# Patient Record
Sex: FEMALE | Race: WHITE | Employment: FULL TIME | URBAN - METROPOLITAN AREA
[De-identification: names, ages, dates, MRNs, and addresses within clinical notes are randomized per-mention and may not be internally consistent; named-entity substitution may affect disease eponyms.]

---

## 2017-12-04 DIAGNOSIS — N93.8 DUB (DYSFUNCTIONAL UTERINE BLEEDING): Primary | ICD-10-CM

## 2017-12-04 RX ORDER — NORETHINDRONE ACETATE AND ETHINYL ESTRADIOL AND FERROUS FUMARATE 1MG-20(24)
1 KIT ORAL DAILY
Qty: 28 TABLET | Refills: 1 | Status: SHIPPED | OUTPATIENT
Start: 2017-12-04 | End: 2021-10-30 | Stop reason: ALTCHOICE

## 2018-01-04 ENCOUNTER — OFFICE VISIT (OUTPATIENT)
Dept: OBGYN | Age: 48
End: 2018-01-04
Payer: COMMERCIAL

## 2018-01-04 ENCOUNTER — HOSPITAL ENCOUNTER (OUTPATIENT)
Age: 48
Setting detail: SPECIMEN
Discharge: HOME | End: 2018-01-04
Payer: COMMERCIAL

## 2018-01-04 VITALS
DIASTOLIC BLOOD PRESSURE: 74 MMHG | HEIGHT: 64 IN | WEIGHT: 234 LBS | SYSTOLIC BLOOD PRESSURE: 130 MMHG | BODY MASS INDEX: 39.95 KG/M2

## 2018-01-04 DIAGNOSIS — Z00.00 WELLNESS EXAMINATION: Primary | ICD-10-CM

## 2018-01-04 DIAGNOSIS — Z01.419 WOMEN'S ANNUAL ROUTINE GYNECOLOGICAL EXAMINATION: ICD-10-CM

## 2018-01-04 LAB
BILIRUBIN, POC: NORMAL
BLOOD URINE, POC: NORMAL
CLARITY, POC: CLEAR
COLOR, POC: YELLOW
GLUCOSE URINE, POC: NORMAL
HGB, POC: 13.4
KETONES, POC: NORMAL
LEUKOCYTE EST, POC: NORMAL
NITRITE, POC: NORMAL
PH, POC: 6
PROTEIN, POC: NORMAL
SPECIFIC GRAVITY, POC: 1.01
UROBILINOGEN, POC: NORMAL

## 2018-01-04 PROCEDURE — G0145 SCR C/V CYTO,THINLAYER,RESCR: HCPCS

## 2018-01-04 PROCEDURE — 99396 PREV VISIT EST AGE 40-64: CPT | Performed by: ADVANCED PRACTICE MIDWIFE

## 2018-01-04 PROCEDURE — 87624 HPV HI-RISK TYP POOLED RSLT: CPT

## 2018-01-04 RX ORDER — NORETHINDRONE ACETATE AND ETHINYL ESTRADIOL AND FERROUS FUMARATE 1MG-20(24)
1 KIT ORAL DAILY
Qty: 28 TABLET | Refills: 12 | Status: SHIPPED | OUTPATIENT
Start: 2018-01-04 | End: 2021-10-30 | Stop reason: ALTCHOICE

## 2018-01-04 RX ORDER — M-VIT,TX,IRON,MINS/CALC/FOLIC 27MG-0.4MG
1 TABLET ORAL DAILY
COMMUNITY

## 2018-01-04 RX ORDER — LISINOPRIL 10 MG/1
10 TABLET ORAL DAILY
COMMUNITY
End: 2022-11-02 | Stop reason: ALTCHOICE

## 2018-01-04 ASSESSMENT — PATIENT HEALTH QUESTIONNAIRE - PHQ9
2. FEELING DOWN, DEPRESSED OR HOPELESS: 0
1. LITTLE INTEREST OR PLEASURE IN DOING THINGS: 0
SUM OF ALL RESPONSES TO PHQ QUESTIONS 1-9: 0
SUM OF ALL RESPONSES TO PHQ9 QUESTIONS 1 & 2: 0

## 2018-01-05 LAB
HPV SAMPLE: NORMAL
HPV SOURCE: NORMAL
HPV, GENOTYPE 16: NOT DETECTED
HPV, GENOTYPE 18: NOT DETECTED
HPV, HIGH RISK OTHER: NOT DETECTED
HPV, INTERPRETATION: NORMAL

## 2018-01-05 ASSESSMENT — ENCOUNTER SYMPTOMS
SORE THROAT: 0
SHORTNESS OF BREATH: 0
CONSTIPATION: 0
NAUSEA: 0
VOMITING: 0
ABDOMINAL PAIN: 0
BACK PAIN: 0
DIARRHEA: 0

## 2018-01-15 LAB — CYTOLOGY REPORT: NORMAL

## 2018-03-14 ENCOUNTER — HOSPITAL ENCOUNTER (OUTPATIENT)
Dept: WOMENS IMAGING | Age: 48
Discharge: HOME | End: 2018-03-16
Payer: COMMERCIAL

## 2018-03-14 DIAGNOSIS — Z01.419 WOMEN'S ANNUAL ROUTINE GYNECOLOGICAL EXAMINATION: ICD-10-CM

## 2018-03-14 PROCEDURE — 77067 SCR MAMMO BI INCL CAD: CPT

## 2019-01-10 DIAGNOSIS — N93.8 DUB (DYSFUNCTIONAL UTERINE BLEEDING): Primary | ICD-10-CM

## 2019-01-10 RX ORDER — NORETHINDRONE ACETATE AND ETHINYL ESTRADIOL AND FERROUS FUMARATE 1MG-20(24)
1 KIT ORAL DAILY
Qty: 28 TABLET | Refills: 3 | Status: SHIPPED | OUTPATIENT
Start: 2019-01-10 | End: 2019-05-21 | Stop reason: SDUPTHER

## 2019-04-24 DIAGNOSIS — N93.8 DUB (DYSFUNCTIONAL UTERINE BLEEDING): Primary | ICD-10-CM

## 2019-04-24 RX ORDER — NORETHINDRONE ACETATE AND ETHINYL ESTRADIOL AND FERROUS FUMARATE 1MG-20(24)
1 KIT ORAL DAILY
Qty: 28 TABLET | Refills: 3 | Status: SHIPPED | OUTPATIENT
Start: 2019-04-24 | End: 2021-10-30 | Stop reason: ALTCHOICE

## 2019-05-21 DIAGNOSIS — N93.8 DUB (DYSFUNCTIONAL UTERINE BLEEDING): ICD-10-CM

## 2019-05-21 RX ORDER — NORETHINDRONE ACETATE AND ETHINYL ESTRADIOL AND FERROUS FUMARATE 1MG-20(24)
KIT ORAL
Qty: 28 TABLET | Refills: 3 | Status: SHIPPED | OUTPATIENT
Start: 2019-05-21 | End: 2021-10-11 | Stop reason: SDUPTHER

## 2019-07-08 DIAGNOSIS — N93.8 DUB (DYSFUNCTIONAL UTERINE BLEEDING): Primary | ICD-10-CM

## 2019-07-08 RX ORDER — NORETHINDRONE ACETATE AND ETHINYL ESTRADIOL 1MG-20(21)
1 KIT ORAL DAILY
Qty: 1 PACKET | Refills: 3 | Status: SHIPPED | OUTPATIENT
Start: 2019-07-08 | End: 2022-01-13

## 2019-08-22 ENCOUNTER — OFFICE VISIT (OUTPATIENT)
Dept: OBGYN | Age: 49
End: 2019-08-22
Payer: COMMERCIAL

## 2019-08-22 VITALS
BODY MASS INDEX: 37.52 KG/M2 | SYSTOLIC BLOOD PRESSURE: 134 MMHG | WEIGHT: 219.8 LBS | HEIGHT: 64 IN | DIASTOLIC BLOOD PRESSURE: 82 MMHG

## 2019-08-22 DIAGNOSIS — Z01.419 ENCOUNTER FOR ANNUAL ROUTINE GYNECOLOGICAL EXAMINATION: Primary | ICD-10-CM

## 2019-08-22 PROCEDURE — 99396 PREV VISIT EST AGE 40-64: CPT | Performed by: ADVANCED PRACTICE MIDWIFE

## 2019-08-22 RX ORDER — NORETHINDRONE ACETATE AND ETHINYL ESTRADIOL AND FERROUS FUMARATE 1MG-20(24)
1 KIT ORAL DAILY
Qty: 28 TABLET | Refills: 12 | Status: SHIPPED | OUTPATIENT
Start: 2019-08-22 | End: 2021-10-30 | Stop reason: ALTCHOICE

## 2019-08-22 ASSESSMENT — ENCOUNTER SYMPTOMS
DIARRHEA: 0
SHORTNESS OF BREATH: 0
BACK PAIN: 0
NAUSEA: 0
ABDOMINAL PAIN: 0
CONSTIPATION: 0
SORE THROAT: 0

## 2019-08-22 ASSESSMENT — PATIENT HEALTH QUESTIONNAIRE - PHQ9
1. LITTLE INTEREST OR PLEASURE IN DOING THINGS: 0
SUM OF ALL RESPONSES TO PHQ QUESTIONS 1-9: 0
2. FEELING DOWN, DEPRESSED OR HOPELESS: 0
SUM OF ALL RESPONSES TO PHQ QUESTIONS 1-9: 0
SUM OF ALL RESPONSES TO PHQ9 QUESTIONS 1 & 2: 0

## 2019-12-04 ENCOUNTER — HOSPITAL ENCOUNTER (OUTPATIENT)
Dept: WOMENS IMAGING | Age: 49
Discharge: HOME | End: 2019-12-06
Payer: COMMERCIAL

## 2019-12-04 DIAGNOSIS — Z01.419 ENCOUNTER FOR ANNUAL ROUTINE GYNECOLOGICAL EXAMINATION: ICD-10-CM

## 2019-12-04 PROCEDURE — 77063 BREAST TOMOSYNTHESIS BI: CPT

## 2020-09-24 ENCOUNTER — OFFICE VISIT (OUTPATIENT)
Dept: OBGYN | Age: 50
End: 2020-09-24
Payer: COMMERCIAL

## 2020-09-24 VITALS
BODY MASS INDEX: 39.78 KG/M2 | HEIGHT: 64 IN | SYSTOLIC BLOOD PRESSURE: 130 MMHG | DIASTOLIC BLOOD PRESSURE: 82 MMHG | WEIGHT: 233 LBS

## 2020-09-24 PROCEDURE — 99396 PREV VISIT EST AGE 40-64: CPT | Performed by: ADVANCED PRACTICE MIDWIFE

## 2020-09-24 RX ORDER — NORETHINDRONE ACETATE AND ETHINYL ESTRADIOL AND FERROUS FUMARATE 1MG-20(24)
1 KIT ORAL DAILY
Qty: 28 TABLET | Refills: 12 | Status: SHIPPED | OUTPATIENT
Start: 2020-09-24 | End: 2021-10-30 | Stop reason: ALTCHOICE

## 2020-09-24 ASSESSMENT — PATIENT HEALTH QUESTIONNAIRE - PHQ9
SUM OF ALL RESPONSES TO PHQ QUESTIONS 1-9: 0
SUM OF ALL RESPONSES TO PHQ QUESTIONS 1-9: 0
SUM OF ALL RESPONSES TO PHQ9 QUESTIONS 1 & 2: 0
2. FEELING DOWN, DEPRESSED OR HOPELESS: 0
1. LITTLE INTEREST OR PLEASURE IN DOING THINGS: 0

## 2020-09-24 ASSESSMENT — ENCOUNTER SYMPTOMS
BACK PAIN: 0
SHORTNESS OF BREATH: 0
ABDOMINAL PAIN: 0

## 2020-09-24 NOTE — PROGRESS NOTES
YEARLY PHYSICAL    Date of service: 2020    Annie Mike  Is a 52 y.o.   female    PT's PCP is: Ida Garcia MD     : 1970                                             Subjective:       Patient's last menstrual period was 2020 (approximate). Are your menses regular: yes    OB History    Para Term  AB Living   3 2 2   1 2   SAB TAB Ectopic Molar Multiple Live Births   1         2      # Outcome Date GA Lbr Ronny/2nd Weight Sex Delivery Anes PTL Lv   3 Term 01 40w0d  8 lb 10 oz (3.912 kg) M Vag-Spont   HANK   2 SAB 2000           1 Term 97 39w0d  6 lb 13 oz (3.09 kg) F Vag-Spont   HANK        Social History     Tobacco Use   Smoking Status Never Smoker   Smokeless Tobacco Never Used        Social History     Substance and Sexual Activity   Alcohol Use Yes    Comment: social       Family History   Problem Relation Age of Onset    Lung Cancer Father     Other Other         No family h/o ovarian or breast cancer, no family h/o DVT. Any family history of breast or ovarian cancer: No    Any family history of blood clots: No      Allergies: Patient has no known allergies.       Current Outpatient Medications:     MINASTRIN 24 FE 1-20 MG-MCG(24) CHEW, take 1 tablet by mouth once daily, Disp: 28 tablet, Rfl: 3    Multiple Vitamins-Minerals (THERAPEUTIC MULTIVITAMIN-MINERALS) tablet, Take 1 tablet by mouth daily, Disp: , Rfl:     aspirin 81 MG tablet, Take 81 mg by mouth daily, Disp: , Rfl:     Norethin Ace-Eth Estrad-FE 1-20 MG-MCG(24) CHEW, Take 1 tablet by mouth daily (Patient not taking: Reported on 2020), Disp: 28 tablet, Rfl: 12    norethindrone-ethinyl estradiol (LOESTRIN FE 1/20) 1-20 MG-MCG per tablet, Take 1 tablet by mouth daily (Patient not taking: Reported on 2019), Disp: 1 packet, Rfl: 3    Norethin Ace-Eth Estrad-FE 1-20 MG-MCG(24) CHEW, Take 1 tablet by mouth daily (Patient not taking: Reported on 8/22/2019), Disp: 28 tablet, Rfl: 3    lisinopril (PRINIVIL;ZESTRIL) 10 MG tablet, Take 10 mg by mouth daily, Disp: , Rfl:     Norethin Ace-Eth Estrad-FE (MINASTRIN 24 FE) 1-20 MG-MCG(24) CHEW, Take 1 tablet by mouth daily (Patient not taking: Reported on 8/22/2019), Disp: 28 tablet, Rfl: 12    Norethin Ace-Eth Estrad-FE (MINASTRIN 24 FE) 1-20 MG-MCG(24) CHEW, Take 1 tablet by mouth daily (Patient not taking: Reported on 8/22/2019), Disp: 28 tablet, Rfl: 1    Social History     Substance and Sexual Activity   Sexual Activity Yes    Partners: Male    Birth control/protection: Pill, Surgical    Comment: spouse vasectomy       Any bleeding or pain with intercourse: No    Last Yearly:  2019    Last pap: 2018    Last HPV: 2018    Last Mammogram: 12/04/2019    Last Dexascan na    Last colorectal screen- type:na*  date  na    Do you do self breast exams: Yes    Past Medical History:   Diagnosis Date    Hypertension        Past Surgical History:   Procedure Laterality Date    BREAST SURGERY      silicon implants       Family History   Problem Relation Age of Onset    Lung Cancer Father     Other Other         No family h/o ovarian or breast cancer, no family h/o DVT. Chief Complaint   Patient presents with    Gynecologic Exam     Yearly exam. Last pap 01/04/20108 negative, HPV not detected. PE:  Vital Signs  Blood pressure 130/82, height 5' 4\" (1.626 m), weight 233 lb (105.7 kg), last menstrual period 09/04/2020, not currently breastfeeding. Estimated body mass index is 39.99 kg/m² as calculated from the following:    Height as of this encounter: 5' 4\" (1.626 m). Weight as of this encounter: 233 lb (105.7 kg). Labs:    No results found for this visit on 09/24/20. NURSE: Oli GONZALEZ    HPI: here for routine ob visit, \"feel good\"    Review of Systems   Constitutional: Negative. HENT: Negative for congestion.     Respiratory: Negative for shortness of breath. Cardiovascular: Negative for chest pain. Gastrointestinal: Negative for abdominal pain. Genitourinary: Negative for dysuria. Musculoskeletal: Negative for back pain. Skin: Negative for rash. Neurological: Negative for headaches. Psychiatric/Behavioral: The patient is not nervous/anxious. Objective  Lymphatic:   no lymphadenopathy  Heent:   negative   Cor: regular rate and rhythm, no murmurs              Pul:clear to auscultation bilaterally- no wheezes, rales or rhonchi, normal air movement, no respiratory distress      GI: Abdomen soft, non-tender. BS normal. No masses,  No organomegaly           Extremities: normal strength, tone, and muscle mass   Breasts: Breast:normal appearance, no masses or tenderness   Pelvic Exam: GENITAL/URINARY:  External Genitalia:  General appearance; normal, Hair distribution; normal, Lesions absent  Urethral Meatus:  Size normal, Location normal, Lesions absent, Prolapse absent  Urethra: Fullness absent, Masses absent  Bladder:  Fullness absent, Masses absent, Tenderness absent, Cystocele absent  Vagina:  General appearance normal, Estrogen effect normal, Discharge absent, Lesions absent, Pelvic support normal  Cervix:  General appearance normal, Lesions absent, Discharge absent, Tenderness absent, Enlargement absent, Nodularity absent  Uterus:  Size normal, Tenderness absent  Adenexa: Masses absent, Tenderness absent  Anus/Perineum:  Lesions absent and Masses absent                                                              Assessment and Plan          Diagnosis Orders   1. Encounter for annual routine gynecological examination     2. Menorrhagia with regular cycle  Norethin Ace-Eth Estrad-FE 1-20 MG-MCG(24) CHEW   3. Screening mammogram, encounter for  [facility] DIGITAL SCREEN W OR WO CAD BILATERAL             I am having Martine Edwards start on Norethin Ace-Eth Estrad-FE.  I am also having her maintain her Norethin Ace-Eth Estrad-FE, lisinopril, therapeutic multivitamin-minerals, aspirin, Norethin Ace-Eth Estrad-FE, Norethin Ace-Eth Estrad-FE, Minastrin 24 Fe, norethindrone-ethinyl estradiol, and Norethin Ace-Eth Estrad-FE. Return in about 1 year (around 9/24/2021) for yearly. She was also counseled on her preventative health maintenance recommendations and follow-up. There are no Patient Instructions on file for this visit.     Mariangel Alvarenga,9/24/2020 9:44 AM

## 2021-10-11 DIAGNOSIS — N93.8 DUB (DYSFUNCTIONAL UTERINE BLEEDING): ICD-10-CM

## 2021-10-11 RX ORDER — NORETHINDRONE ACETATE AND ETHINYL ESTRADIOL AND FERROUS FUMARATE 1MG-20(24)
KIT ORAL
Qty: 28 TABLET | Refills: 0 | Status: SHIPPED | OUTPATIENT
Start: 2021-10-11 | End: 2021-10-30 | Stop reason: ALTCHOICE

## 2021-10-29 ENCOUNTER — OFFICE VISIT (OUTPATIENT)
Dept: OBGYN | Age: 51
End: 2021-10-29
Payer: COMMERCIAL

## 2021-10-29 VITALS
WEIGHT: 205 LBS | SYSTOLIC BLOOD PRESSURE: 122 MMHG | BODY MASS INDEX: 35 KG/M2 | DIASTOLIC BLOOD PRESSURE: 62 MMHG | HEIGHT: 64 IN

## 2021-10-29 DIAGNOSIS — Z78.0 MENOPAUSE: ICD-10-CM

## 2021-10-29 DIAGNOSIS — Z12.31 SCREENING MAMMOGRAM, ENCOUNTER FOR: ICD-10-CM

## 2021-10-29 DIAGNOSIS — Z12.11 SCREEN FOR COLON CANCER: ICD-10-CM

## 2021-10-29 DIAGNOSIS — Z01.419 ENCOUNTER FOR ANNUAL ROUTINE GYNECOLOGICAL EXAMINATION: Primary | ICD-10-CM

## 2021-10-29 PROCEDURE — 99396 PREV VISIT EST AGE 40-64: CPT | Performed by: ADVANCED PRACTICE MIDWIFE

## 2021-10-29 PROCEDURE — G8484 FLU IMMUNIZE NO ADMIN: HCPCS | Performed by: ADVANCED PRACTICE MIDWIFE

## 2021-10-29 RX ORDER — NALTREXONE HYDROCHLORIDE 50 MG/1
TABLET, FILM COATED ORAL
COMMUNITY
Start: 2021-10-10

## 2021-10-29 RX ORDER — BUPROPION HYDROCHLORIDE 150 MG/1
TABLET, EXTENDED RELEASE ORAL
COMMUNITY
Start: 2021-09-27 | End: 2022-11-02

## 2021-10-29 RX ORDER — SEMAGLUTIDE 1.34 MG/ML
INJECTION, SOLUTION SUBCUTANEOUS
COMMUNITY
Start: 2021-10-21

## 2021-10-29 ASSESSMENT — PATIENT HEALTH QUESTIONNAIRE - PHQ9
SUM OF ALL RESPONSES TO PHQ QUESTIONS 1-9: 0
SUM OF ALL RESPONSES TO PHQ9 QUESTIONS 1 & 2: 0
SUM OF ALL RESPONSES TO PHQ QUESTIONS 1-9: 0
1. LITTLE INTEREST OR PLEASURE IN DOING THINGS: 0
SUM OF ALL RESPONSES TO PHQ QUESTIONS 1-9: 0
2. FEELING DOWN, DEPRESSED OR HOPELESS: 0

## 2021-10-29 NOTE — PROGRESS NOTES
YEARLY PHYSICAL    Date of service: 10/29/2021    Marcell Escalante  Is a 46 y.o.   female    PT's PCP is: Sesar Tadeo MD     : 1970                                             Subjective:       Patient's last menstrual period was 10/15/2021 (approximate). Are your menses regular: yes    OB History    Para Term  AB Living   3 2 2   1 2   SAB TAB Ectopic Molar Multiple Live Births   1         2      # Outcome Date GA Lbr Ronny/2nd Weight Sex Delivery Anes PTL Lv   3 Term 01 40w0d  8 lb 10 oz (3.912 kg) M Vag-Spont   HANK   2 SAB 2000           1 Term 97 39w0d  6 lb 13 oz (3.09 kg) F Vag-Spont   HANK        Social History     Tobacco Use   Smoking Status Never Smoker   Smokeless Tobacco Never Used        Social History     Substance and Sexual Activity   Alcohol Use Yes    Comment: social       Family History   Problem Relation Age of Onset    Lung Cancer Father     Other Other         No family h/o ovarian or breast cancer, no family h/o DVT. Any family history of breast or ovarian cancer: No    Any family history of blood clots: No    Have you had a positive covid test: Yes    Have you had the covid immunization: Yes      Allergies: Patient has no known allergies.       Current Outpatient Medications:     lisinopril (PRINIVIL;ZESTRIL) 10 MG tablet, Take 10 mg by mouth daily, Disp: , Rfl:     Multiple Vitamins-Minerals (THERAPEUTIC MULTIVITAMIN-MINERALS) tablet, Take 1 tablet by mouth daily, Disp: , Rfl:     aspirin 81 MG tablet, Take 81 mg by mouth daily, Disp: , Rfl:     buPROPion (WELLBUTRIN SR) 150 MG extended release tablet, take 1 tablet by mouth every morning FOR 1 WEEK, IF NO SIDE EFFECTS ADD A SECOND TAB BEFORE SUPPER, Disp: , Rfl:     naltrexone (DEPADE) 50 MG tablet, take 0.5 tablet by mouth once daily, Disp: , Rfl:     OZEMPIC, 0.25 OR 0.5 MG/DOSE, 2 MG/1.5ML SOPN, inject 0.5 milligrams subcutaneously every week, Disp: , Rfl:     norethindrone-ethinyl estradiol (LOESTRIN FE 1/20) 1-20 MG-MCG per tablet, Take 1 tablet by mouth daily (Patient not taking: Reported on 8/22/2019), Disp: 1 packet, Rfl: 3    Social History     Substance and Sexual Activity   Sexual Activity Yes    Partners: Male    Birth control/protection: Pill, Surgical    Comment: spouse vasectomy       Any bleeding or pain with intercourse: No    Last Yearly:  09/2020    Last pap: 2018    Last HPV: 2018    Last Mammogram: 2020    Last Ciara Michaelrier never    Last colorectal screen- type:never*  date  never    Do you do self breast exams: Yes    Past Medical History:   Diagnosis Date    Hypertension        Past Surgical History:   Procedure Laterality Date    BREAST SURGERY      silicon implants       Family History   Problem Relation Age of Onset    Lung Cancer Father     Other Other         No family h/o ovarian or breast cancer, no family h/o DVT. Chief Complaint   Patient presents with    Gynecologic Exam     Yearly exam. Last pap 01/04/2018 negative, HPV not detected. PE:  Vital Signs  Blood pressure 122/62, height 5' 4\" (1.626 m), weight 205 lb (93 kg), last menstrual period 10/15/2021, not currently breastfeeding. Estimated body mass index is 35.19 kg/m² as calculated from the following:    Height as of this encounter: 5' 4\" (1.626 m). Weight as of this encounter: 205 lb (93 kg). Labs:    No results found for this visit on 10/29/21. PHQ-9 Total Score: 0 (10/29/2021  8:55 AM)      NURSE:  Rubén GONZALEZ    HPI: here for annual gyn exam    Review of Systems   Constitutional: Negative. HENT: Negative for congestion. Respiratory: Negative for shortness of breath. Cardiovascular: Negative for chest pain. Gastrointestinal: Negative for abdominal pain. Genitourinary: Negative for dysuria. Musculoskeletal: Negative for back pain. Skin: Negative for rash.    Neurological: Negative for headaches. Psychiatric/Behavioral: The patient is not nervous/anxious. Objective  Lymphatic:   no lymphadenopathy  Heent:   negative   Cor: regular rate and rhythm, no murmurs              Pul:clear to auscultation bilaterally- no wheezes, rales or rhonchi, normal air movement, no respiratory distress      GI: Abdomen soft, non-tender. BS normal. No masses,  No organomegaly           Extremities: normal strength, tone, and muscle mass   Breasts: Breast:normal appearance, no masses or tenderness   Pelvic Exam: GENITAL/URINARY:  External Genitalia:  General appearance; normal, Hair distribution; normal, Lesions absent  Urethral Meatus:  Size normal, Location normal, Lesions absent, Prolapse absent  Urethra: Fullness absent, Masses absent  Bladder:  Fullness absent, Masses absent, Tenderness absent, Cystocele absent  Vagina:  General appearance normal, Estrogen effect normal, Discharge absent, Lesions absent, Pelvic support normal  Cervix:  General appearance normal, Lesions absent, Discharge absent, Tenderness absent, Enlargement absent, Nodularity absent  Uterus:  Size normal, Tenderness absent  Adenexa: Masses absent, Tenderness absent  Anus/Perineum:  Lesions absent and Masses absent                              Assessment and Plan          Diagnosis Orders   1. Encounter for annual routine gynecological examination     2. Menopause  Follicle Stimulating Hormone    Luteinizing Hormone    Estradiol   3. Screen for colon cancer  [facility], [place], , General Surgery, [place]   4. Screening mammogram, encounter for  [facility] DIGITAL SCREEN BILATERAL       will stop ocps for one month and then check menopausal hormones      I have discontinued Martine Marrero's Norethin Ace-Eth Estrad-FE, Norethin Ace-Eth Estrad-FE, Norethin Ace-Eth Estrad-FE, Norethin Ace-Eth Estrad-FE, Norethin Ace-Eth Estrad-FE, and Norethin Ace-Eth Estrad-FE.  I am also having her maintain her lisinopril, therapeutic multivitamin-minerals, aspirin, norethindrone-ethinyl estradiol, buPROPion, naltrexone, and Ozempic (0.25 or 0.5 MG/DOSE). Return in about 1 year (around 10/29/2022) for yearly, will call with results. She was also counseled on her preventative health maintenance recommendations and follow-up. There are no Patient Instructions on file for this visit.     CHARLOTTE Patel CNM,10/30/2021 5:01 PM

## 2021-10-30 ASSESSMENT — ENCOUNTER SYMPTOMS
SHORTNESS OF BREATH: 0
BACK PAIN: 0
ABDOMINAL PAIN: 0

## 2021-11-03 ENCOUNTER — TELEPHONE (OUTPATIENT)
Dept: SURGERY | Age: 51
End: 2021-11-03

## 2021-11-03 NOTE — TELEPHONE ENCOUNTER
Patient scheduled for direct colonoscopy on 1/28/22 with Dr. Jorge Gill. All questions answered at this time, bowel prep instructions mailed to patient.

## 2021-12-07 DIAGNOSIS — Z78.0 MENOPAUSE: ICD-10-CM

## 2022-01-06 ENCOUNTER — HOSPITAL ENCOUNTER (OUTPATIENT)
Dept: WOMENS IMAGING | Age: 52
Discharge: HOME | End: 2022-01-08
Payer: COMMERCIAL

## 2022-01-06 DIAGNOSIS — Z12.31 SCREENING MAMMOGRAM, ENCOUNTER FOR: ICD-10-CM

## 2022-01-06 PROCEDURE — 77063 BREAST TOMOSYNTHESIS BI: CPT

## 2022-01-10 DIAGNOSIS — R92.8 ABNORMAL MAMMOGRAM OF BOTH BREASTS: Primary | ICD-10-CM

## 2022-01-13 NOTE — PROGRESS NOTES
No COVID test required, patient fully vaccinated, COVID immunizations recorded in [facility]

## 2022-01-14 ENCOUNTER — HOSPITAL ENCOUNTER (OUTPATIENT)
Dept: ULTRASOUND IMAGING | Age: 52
Discharge: HOME | End: 2022-01-16
Payer: COMMERCIAL

## 2022-01-14 ENCOUNTER — HOSPITAL ENCOUNTER (OUTPATIENT)
Dept: WOMENS IMAGING | Age: 52
Discharge: HOME | End: 2022-01-16
Payer: COMMERCIAL

## 2022-01-14 DIAGNOSIS — R92.8 ABNORMAL MAMMOGRAM OF BOTH BREASTS: ICD-10-CM

## 2022-01-14 PROCEDURE — G0279 TOMOSYNTHESIS, MAMMO: HCPCS

## 2022-01-14 PROCEDURE — 76642 ULTRASOUND BREAST LIMITED: CPT

## 2022-01-17 DIAGNOSIS — R92.8 ABNORMAL MAMMOGRAM OF LEFT BREAST: Primary | ICD-10-CM

## 2022-01-27 ENCOUNTER — ANESTHESIA EVENT (OUTPATIENT)
Dept: OPERATING ROOM | Age: 52
End: 2022-01-27
Payer: COMMERCIAL

## 2022-01-28 ENCOUNTER — ANESTHESIA (OUTPATIENT)
Dept: OPERATING ROOM | Age: 52
End: 2022-01-28
Payer: COMMERCIAL

## 2022-01-28 ENCOUNTER — HOSPITAL ENCOUNTER (OUTPATIENT)
Age: 52
Setting detail: OUTPATIENT SURGERY
Discharge: HOME | End: 2022-01-28
Attending: SURGERY | Admitting: SURGERY
Payer: COMMERCIAL

## 2022-01-28 VITALS
HEIGHT: 64 IN | WEIGHT: 191 LBS | SYSTOLIC BLOOD PRESSURE: 154 MMHG | BODY MASS INDEX: 32.61 KG/M2 | OXYGEN SATURATION: 100 % | TEMPERATURE: 99.2 F | RESPIRATION RATE: 16 BRPM | HEART RATE: 80 BPM | DIASTOLIC BLOOD PRESSURE: 82 MMHG

## 2022-01-28 VITALS
RESPIRATION RATE: 14 BRPM | SYSTOLIC BLOOD PRESSURE: 131 MMHG | DIASTOLIC BLOOD PRESSURE: 69 MMHG | OXYGEN SATURATION: 100 %

## 2022-01-28 PROBLEM — Z12.11 SCREEN FOR COLON CANCER: Status: ACTIVE | Noted: 2022-01-28

## 2022-01-28 PROCEDURE — 2500000003 HC RX 250 WO HCPCS: Performed by: NURSE ANESTHETIST, CERTIFIED REGISTERED

## 2022-01-28 PROCEDURE — 7100000011 HC PHASE II RECOVERY - ADDTL 15 MIN: Performed by: SURGERY

## 2022-01-28 PROCEDURE — 45378 DIAGNOSTIC COLONOSCOPY: CPT | Performed by: SURGERY

## 2022-01-28 PROCEDURE — 7100000010 HC PHASE II RECOVERY - FIRST 15 MIN: Performed by: SURGERY

## 2022-01-28 PROCEDURE — 3609027000 HC COLONOSCOPY: Performed by: SURGERY

## 2022-01-28 PROCEDURE — 3700000000 HC ANESTHESIA ATTENDED CARE: Performed by: SURGERY

## 2022-01-28 PROCEDURE — 6360000002 HC RX W HCPCS: Performed by: NURSE ANESTHETIST, CERTIFIED REGISTERED

## 2022-01-28 PROCEDURE — 2580000003 HC RX 258: Performed by: NURSE ANESTHETIST, CERTIFIED REGISTERED

## 2022-01-28 PROCEDURE — 3700000001 HC ADD 15 MINUTES (ANESTHESIA): Performed by: SURGERY

## 2022-01-28 PROCEDURE — 2709999900 HC NON-CHARGEABLE SUPPLY: Performed by: SURGERY

## 2022-01-28 RX ORDER — PROPOFOL 10 MG/ML
INJECTION, EMULSION INTRAVENOUS PRN
Status: DISCONTINUED | OUTPATIENT
Start: 2022-01-28 | End: 2022-01-28 | Stop reason: SDUPTHER

## 2022-01-28 RX ORDER — PROPOFOL 10 MG/ML
INJECTION, EMULSION INTRAVENOUS CONTINUOUS PRN
Status: DISCONTINUED | OUTPATIENT
Start: 2022-01-28 | End: 2022-01-28 | Stop reason: SDUPTHER

## 2022-01-28 RX ORDER — SODIUM CHLORIDE, SODIUM LACTATE, POTASSIUM CHLORIDE, CALCIUM CHLORIDE 600; 310; 30; 20 MG/100ML; MG/100ML; MG/100ML; MG/100ML
INJECTION, SOLUTION INTRAVENOUS CONTINUOUS
Status: DISCONTINUED | OUTPATIENT
Start: 2022-01-28 | End: 2022-01-28 | Stop reason: HOSPADM

## 2022-01-28 RX ORDER — LIDOCAINE HYDROCHLORIDE 20 MG/ML
INJECTION, SOLUTION EPIDURAL; INFILTRATION; INTRACAUDAL; PERINEURAL PRN
Status: DISCONTINUED | OUTPATIENT
Start: 2022-01-28 | End: 2022-01-28 | Stop reason: SDUPTHER

## 2022-01-28 RX ADMIN — PROPOFOL 40 MG: 10 INJECTION, EMULSION INTRAVENOUS at 11:46

## 2022-01-28 RX ADMIN — SODIUM CHLORIDE, POTASSIUM CHLORIDE, SODIUM LACTATE AND CALCIUM CHLORIDE: 600; 310; 30; 20 INJECTION, SOLUTION INTRAVENOUS at 10:28

## 2022-01-28 RX ADMIN — PROPOFOL 80 MG: 10 INJECTION, EMULSION INTRAVENOUS at 11:43

## 2022-01-28 RX ADMIN — PROPOFOL 180 MCG/KG/MIN: 10 INJECTION, EMULSION INTRAVENOUS at 11:43

## 2022-01-28 RX ADMIN — LIDOCAINE HYDROCHLORIDE 100 MG: 20 INJECTION, SOLUTION EPIDURAL; INFILTRATION; INTRACAUDAL; PERINEURAL at 11:43

## 2022-01-28 ASSESSMENT — LIFESTYLE VARIABLES: SMOKING_STATUS: 0

## 2022-01-28 NOTE — BRIEF OP NOTE
Brief Postoperative Note      Patient: Janna Talbert  YOB: 1970  MRN: Tim Davila MD      Date of Procedure: 1/28/2022    Pre-Op Diagnosis: screening colonoscopy    Post-Op Diagnosis: Same.  Normal. Mild internal hemorrhoids grade 1       Procedure:    Colonoscopy to cecum      Surgeon(s):  Cindy Brown DO      Anesthesia: Monitor Anesthesia Care    Estimated Blood Loss (mL): none    Complications: None    Specimens: none    Electronically signed by Cindy Brown DO, FACOS, FACS on 1/29/2022 at 3:40 PM

## 2022-01-28 NOTE — OP NOTE
Operative Note      Patient: Meli Amanda  YOB: 1970  MRN: Jens Vieira MD      Date of Procedure: 1/28/2022    Pre-Op Diagnosis: screening colonoscopy    Post-Op Diagnosis: Same. Normal. Mild internal hemorrhoids grade 1       Procedure:    Colonoscopy to cecum    Surgeon(s):  Danny Lazaro DO      Anesthesia: Monitor Anesthesia Care    Estimated Blood Loss (mL): none    Complications: None    Specimens: none    Procedure details:    I do meet with the patient in preoperative holding area. Please see H&P for indications for the above named procedure. Patient was brought to the endoscopy suite and placed under monitored anesthesia. Patient was positioned in left lateral position. Time out called and procedure confirmed. The lubricated variable stiffness pediatric colonoscope was carefully passed under direct vision into the rectum, advanced through the sigmoid colon, transverse colon, ascending colon and the cecum. The ileocecal valve was well visualized. Additional findings:    Cecum: normal cecal pouch. IC valve and appendiceal orifice well confirmed  Ascending: normal  Transverse: normal  Descending: normal  Sigmoid: normal  Rectum: normal  Rectal exam: negative  Bowel prep quality: excellent    At the distal 1/3 of the rectum, the scope was retroflexed and shows mild non-inflamed grade 1 internal hemorrhoids. The patient tolerated the procedure well. The abdomen was soft after the procedure. I spoke with pt/family afterwards. Next colonoscopy 10 years for screening.     Electronically signed by Danny Lazaro DO, FACOS, FACS on 1/29/2022 at 3:40 PM

## 2022-01-28 NOTE — ANESTHESIA POSTPROCEDURE EVALUATION
Department of Anesthesiology  Postprocedure Note    Patient: Gavin Monroy  MRN: [id number]  YOB: 1970  Date of evaluation: 1/28/2022  Time:  12:48 PM     Procedure Summary     Date: 01/28/22 Room / Location: [address] [place] [address]    Anesthesia Start: 1140 Anesthesia Stop: 3471    Procedure: [address], NOT HIGH RISK (N/A ) Diagnosis: (SCREENING)    Surgeons: Melania Betancourt DO Responsible Provider: CHARLOTTE Lee CRNA    Anesthesia Type: general, TIVA ASA Status: 2          Anesthesia Type: general, TIVA    Bolivar Phase I:      Bolivar Phase II: Bolivar Score: 10    Last vitals: Reviewed and per EMR flowsheets.        Anesthesia Post Evaluation    Patient location during evaluation: PACU  Patient participation: complete - patient participated  Level of consciousness: awake and alert  Airway patency: patent  Nausea & Vomiting: no nausea and no vomiting  Complications: no  Cardiovascular status: blood pressure returned to baseline and hemodynamically stable  Respiratory status: acceptable and room air  Hydration status: euvolemic

## 2022-01-28 NOTE — ANESTHESIA PRE PROCEDURE
Department of Anesthesiology  Preprocedure Note       Name:  Leo Asa   Age:  46 y.o.  :  1970                                          MRN:  [id number]         Date:  2022      Surgeon: Ramsey Asif):  Olive Oquendo DO    Procedure: Procedure(s):  COLORECTAL CANCER SCREENING, NOT HIGH RISK    Medications prior to admission:   Prior to Admission medications    Medication Sig Start Date End Date Taking? Authorizing Provider   buPROPion (WELLBUTRIN SR) 150 MG extended release tablet take 1 tablet by mouth every morning FOR 1 WEEK, IF NO SIDE EFFECTS ADD A SECOND TAB BEFORE SUPPER 21   Historical Provider, MD   naltrexone (DEPADE) 50 MG tablet take 0.5 tablet by mouth once daily 10/10/21   Historical Provider, MD   OZEMPIC, 0.25 OR 0.5 MG/DOSE, 2 MG/1.5ML SOPN inject 0.5 milligrams subcutaneously every week 10/21/21   Historical Provider, MD   lisinopril (PRINIVIL;ZESTRIL) 10 MG tablet Take 10 mg by mouth daily    Historical Provider, MD   Multiple Vitamins-Minerals (THERAPEUTIC MULTIVITAMIN-MINERALS) tablet Take 1 tablet by mouth daily    Historical Provider, MD   aspirin 81 MG tablet Take 81 mg by mouth daily    Historical Provider, MD       Current medications:    Current Facility-Administered Medications   Medication Dose Route Frequency Provider Last Rate Last Admin    lactated ringers infusion   IntraVENous Continuous CHARLOTTE Lagunas CRNA           Allergies:  No Known Allergies    Problem List:  There is no problem list on file for this patient.       Past Medical History:        Diagnosis Date    Hypertension        Past Surgical History:        Procedure Laterality Date    BREAST SURGERY      silicon implants       Social History:    Social History     Tobacco Use    Smoking status: Never Smoker    Smokeless tobacco: Never Used   Substance Use Topics    Alcohol use: Yes     Comment: social                                Counseling given: Not Answered      Vital Signs (Current):   Vitals:    01/13/22 1644   Weight: 190 lb (86.2 kg)   Height: 5' 4\" (1.626 m)                                              BP Readings from Last 3 Encounters:   10/29/21 122/62   09/24/20 130/82   08/22/19 134/82       NPO Status:                                                                                 BMI:   Wt Readings from Last 3 Encounters:   01/13/22 190 lb (86.2 kg)   10/29/21 205 lb (93 kg)   09/24/20 233 lb (105.7 kg)     Body mass index is 32.61 kg/m². CBC:   Lab Results   Component Value Date    HGB 13.4 01/04/2018       CMP: No results found for: NA, K, CL, CO2, BUN, CREATININE, GFRAA, AGRATIO, LABGLOM, GLUCOSE, GLU, PROT, CALCIUM, BILITOT, ALKPHOS, AST, ALT    POC Tests: No results for input(s): POCGLU, POCNA, POCK, POCCL, POCBUN, POCHEMO, POCHCT in the last 72 hours. Coags: No results found for: PROTIME, INR, APTT    HCG (If Applicable): No results found for: PREGTESTUR, PREGSERUM, HCG, HCGQUANT     ABGs: No results found for: PHART, PO2ART, CEL0VAU, QJI6CKX, BEART, T8NQOBSA     Type & Screen (If Applicable):  No results found for: LABABO, LABRH    Drug/Infectious Status (If Applicable):  No results found for: HIV, HEPCAB    COVID-19 Screening (If Applicable): No results found for: COVID19        Anesthesia Evaluation  Patient summary reviewed and Nursing notes reviewed no history of anesthetic complications:   Airway: Mallampati: II  TM distance: >3 FB   Neck ROM: full  Mouth opening: > = 3 FB Dental: normal exam         Pulmonary:Negative Pulmonary ROS and normal exam        (-) not a current smoker                           Cardiovascular:    (+) hypertension:,                   Neuro/Psych:   (+) depression/anxiety             GI/Hepatic/Renal:   (+) GERD (denies at this time): well controlled, bowel prep,           Endo/Other: Negative Endo/Other ROS                    Abdominal:   (+) obese,           Vascular: negative vascular ROS.          Other Findings: Anesthesia Plan      general and TIVA     ASA 2       Induction: intravenous. Anesthetic plan and risks discussed with patient.                       CHARLOTTE Au - CRNA   1/28/2022

## 2022-01-28 NOTE — H&P
GENERAL SURGERY CONSULTATION      Patient's Name/ Date of Birth/ Gender: Juan C Pierre / 1970 (46 y.o.) / female     PCP: Faustina Burrell MD  Referring: direct referral    History of present Illness:  Patient is a pleasant 46 y.o. female  kindly referred by Faustina Burrell MD   Due for screening colonoscopy    Past Medical History:  has a past medical history of Hypertension. Past Surgical History:   Past Surgical History:   Procedure Laterality Date    BREAST SURGERY      silicon implants       Social History:  reports that she has never smoked. She has never used smokeless tobacco. She reports current alcohol use. She reports that she does not use drugs. Family History: family history includes Lung Cancer in her father; Other in an other family member.     Review of Systems:   General: Completed and, except as mentioned above, was negative or noncontributory  Psychological:  Completed and, except as mentioned above, was negative or noncontributory  Ophthalmic:  Completed and, except as mentioned above, was negative or noncontributory  ENT:  Completed and, except as mentioned above, was negative or noncontributory  Allergy and Immunology:  Completed and, except as mentioned above, was negative or noncontributory  Hematological and Lymphatic:  Completed and, except as mentioned above, was negative or noncontributory  Endocrine: Completed and, except as mentioned above, was negative or noncontributory  Breast:  Completed and, except as mentioned above, was negative or noncontributory  Respiratory:  Completed and, except as mentioned above, was negative or noncontributory  Cardiovascular:  Completed and, except as mentioned above, was negative or noncontributory  Gastrointestinal: Completed and, except as mentioned above, was negative or noncontributory  Genito-Urinary:  Completed and, except as mentioned above, was negative or noncontributory  Musculoskeletal:  Completed and, except as mentioned above, was negative or noncontributory  Neurological:  Completed and, except as mentioned above, was negative or noncontributory  Dermatological:  Completed and, except as mentioned above, was negative or noncontributory    Allergies: Patient has no known allergies. Current Meds:  Current Facility-Administered Medications:     lactated ringers infusion, , IntraVENous, Continuous, Myrl Flaming, APRN - CRNA, Last Rate: 100 mL/hr at 01/28/22 1028, New Bag at 01/28/22 1028    Physical Exam:  Vital signs and Nurse's note reviewed. BP (!) 158/95   Pulse 84   Temp 97.3 °F (36.3 °C) (Temporal)   Resp 18   Ht 5' 4\" (1.626 m)   Wt 191 lb (86.6 kg)   LMP 12/15/2021   SpO2 97%   BMI 32.79 kg/m²    height is 5' 4\" (1.626 m) and weight is 191 lb (86.6 kg). Her temporal temperature is 97.3 °F (36.3 °C). Her blood pressure is 158/95 (abnormal) and her pulse is 84. Her respiration is 18 and oxygen saturation is 97%. Gen:  A&Ox3, NAD. Pleasant and cooperative. HEENT: PERRLA, EOMI, no scleral icterus  Neck:  no goiter  CVS: Regular rate and rhythm  Resp: Good bilateral air entry, no active wheezing, no labored breathing  Abd: soft, non-tender, non-distended, bowel sounds present rectal exam to be done at scope  Ext: Moves all extremities, no gross focal motor deficits  Skin: No erythema or ulcerations     Labs:   Lab Results   Component Value Date    HGB 13.4 01/04/2018     No results found for: NA, K, CL, CO2, BUN, CREATININE, GLUCOSE, CALCIUM  No results found for: ALKPHOS, ALT, AST, PROT, BILITOT, BILIDIR, LABALBU  No results found for: AMYLASE  No results found for: LIPASE  No results found for: INR    Radiologic Studies:      Impressions/Recommendations:     Screening colonoscopy    Risks and benefits of colonoscopy have been discussed in detail with the patient. Risks of the procedure include bleeding, bowel perforation, possibly missing smaller lesions if the bowel prep is not adequate.  Alternative studies include

## 2022-02-27 PROBLEM — Z12.11 SCREEN FOR COLON CANCER: Status: RESOLVED | Noted: 2022-01-28 | Resolved: 2022-02-27

## 2022-03-02 DIAGNOSIS — R92.8 ABNORMAL MAMMOGRAM OF LEFT BREAST: Primary | ICD-10-CM

## 2022-08-05 ENCOUNTER — TELEPHONE (OUTPATIENT)
Dept: OBGYN | Age: 52
End: 2022-08-05

## 2022-08-05 NOTE — TELEPHONE ENCOUNTER
Yes this should be a diagnostic left breast but many times the place that did the biopsy wants to do it at their facility, does she remember if they told her that?

## 2022-08-05 NOTE — TELEPHONE ENCOUNTER
Patient states she needs a new order for a ashly. Does it need to be diagnostic since she had an abnormal finding on her last one?

## 2022-08-08 DIAGNOSIS — R92.8 ABNORMAL MAMMOGRAM: Primary | ICD-10-CM

## 2022-09-01 ENCOUNTER — HOSPITAL ENCOUNTER (OUTPATIENT)
Dept: WOMENS IMAGING | Age: 52
Discharge: HOME | End: 2022-09-03
Payer: COMMERCIAL

## 2022-09-01 DIAGNOSIS — R92.8 ABNORMAL MAMMOGRAM: ICD-10-CM

## 2022-09-01 PROCEDURE — G0279 TOMOSYNTHESIS, MAMMO: HCPCS

## 2022-11-02 ENCOUNTER — HOSPITAL ENCOUNTER (OUTPATIENT)
Age: 52
Setting detail: SPECIMEN
Discharge: HOME | End: 2022-11-02
Payer: COMMERCIAL

## 2022-11-02 ENCOUNTER — OFFICE VISIT (OUTPATIENT)
Dept: OBGYN | Age: 52
End: 2022-11-02
Payer: COMMERCIAL

## 2022-11-02 VITALS
BODY MASS INDEX: 28.34 KG/M2 | DIASTOLIC BLOOD PRESSURE: 80 MMHG | WEIGHT: 166 LBS | SYSTOLIC BLOOD PRESSURE: 130 MMHG | HEIGHT: 64 IN

## 2022-11-02 DIAGNOSIS — Z12.4 SCREENING FOR MALIGNANT NEOPLASM OF CERVIX: ICD-10-CM

## 2022-11-02 DIAGNOSIS — Z01.419 ENCOUNTER FOR ANNUAL ROUTINE GYNECOLOGICAL EXAMINATION: Primary | ICD-10-CM

## 2022-11-02 DIAGNOSIS — Z78.0 MENOPAUSE: ICD-10-CM

## 2022-11-02 PROCEDURE — G8484 FLU IMMUNIZE NO ADMIN: HCPCS | Performed by: ADVANCED PRACTICE MIDWIFE

## 2022-11-02 PROCEDURE — G0145 SCR C/V CYTO,THINLAYER,RESCR: HCPCS

## 2022-11-02 PROCEDURE — 99396 PREV VISIT EST AGE 40-64: CPT | Performed by: ADVANCED PRACTICE MIDWIFE

## 2022-11-02 PROCEDURE — 87624 HPV HI-RISK TYP POOLED RSLT: CPT

## 2022-11-02 RX ORDER — BUPROPION HYDROCHLORIDE 300 MG/1
TABLET ORAL
COMMUNITY
Start: 2022-10-09

## 2022-11-02 ASSESSMENT — ENCOUNTER SYMPTOMS
BACK PAIN: 0
SHORTNESS OF BREATH: 0
ABDOMINAL PAIN: 0

## 2022-11-02 NOTE — PROGRESS NOTES
YEARLY PHYSICAL    Date of service: 2022    Rolly Sanchez  Is a 46 y.o.  , female    PT's PCP is: Casey Juarez MD     : 1970                                             Subjective:       Patient's last menstrual period was 2022 (exact date). Are your menses regular: no    OB History    Para Term  AB Living   3 2 2   1 2   SAB IAB Ectopic Molar Multiple Live Births   1         2      # Outcome Date GA Lbr Ronny/2nd Weight Sex Delivery Anes PTL Lv   3 Term 01 40w0d  8 lb 10 oz (3.912 kg) M Vag-Spont   HANK   2 SAB 2000           1 Term 97 39w0d  6 lb 13 oz (3.09 kg) F Vag-Spont   HANK        Social History     Tobacco Use   Smoking Status Never   Smokeless Tobacco Never        Social History     Substance and Sexual Activity   Alcohol Use Yes    Comment: social       Family History   Problem Relation Age of Onset    Lung Cancer Father     Other Other         No family h/o ovarian or breast cancer, no family h/o DVT. Any family history of breast or ovarian cancer: No    Any family history of blood clots: No    Have you had a positive covid test: Yes    Have you had the covid immunization: Yes      Allergies: Patient has no known allergies.       Current Outpatient Medications:     buPROPion (WELLBUTRIN XL) 300 MG extended release tablet, take 1 tablet by mouth every morning, Disp: , Rfl:     naltrexone (DEPADE) 50 MG tablet, take 0.5 tablet by mouth once daily, Disp: , Rfl:     OZEMPIC, 0.25 OR 0.5 MG/DOSE, 2 MG/1.5ML SOPN, inject 0.5 milligrams subcutaneously every week, Disp: , Rfl:     Multiple Vitamins-Minerals (THERAPEUTIC MULTIVITAMIN-MINERALS) tablet, Take 1 tablet by mouth daily, Disp: , Rfl:     aspirin 81 MG tablet, Take 81 mg by mouth daily, Disp: , Rfl:     lisinopril (PRINIVIL;ZESTRIL) 10 MG tablet, Take 10 mg by mouth daily (Patient not taking: Reported on 2022), Disp: , Rfl:     Social History     Substance and Sexual Activity   Sexual Activity Yes    Partners: Male    Birth control/protection: Surgical    Comment: spouse vasectomy       Any bleeding or pain with intercourse: No    Last Yearly:  10/21    Last pap: 1/4/18    Last HPV: 1/4/18    Last Mammogram: 9/1/22    Last Cristian Gilman never    Last colorectal screen- type:Colonoscopy 1/28/22    Do you do self breast exams: Yes    Past Medical History:   Diagnosis Date    Hypertension        Past Surgical History:   Procedure Laterality Date    BREAST BIOPSY Left 02/2022    BREAST SURGERY      silicon implants    COLONOSCOPY  01/28/2022    COLONOSCOPY N/A 01/28/2022    COLORECTAL CANCER SCREENING, NOT HIGH RISK performed by Justina Abraham DO at [address]  08/2022    under nose       Family History   Problem Relation Age of Onset    Lung Cancer Father     Other Other         No family h/o ovarian or breast cancer, no family h/o DVT. Chief Complaint   Patient presents with    Gynecologic Exam     Pt is here today for yearly pap. Previous pap was 1/4/18(-), hpv (-). Pt states cycles have started to become irregular, last full cycle was July, pt had spotting in October just for 2 days, and then spotting today but not full cycles. PE:  Vital Signs  Blood pressure 130/80, height 5' 4\" (1.626 m), weight 166 lb (75.3 kg), last menstrual period 07/01/2022, not currently breastfeeding. Estimated body mass index is 28.49 kg/m² as calculated from the following:    Height as of this encounter: 5' 4\" (1.626 m). Weight as of this encounter: 166 lb (75.3 kg). Labs:    No results found for this visit on 11/02/22. No data recorded    NURSE: LMD    HPI: here for annual gyn exam, c/o lmp in July and spotting x2 since, had some vasomotor symptoms earlier in the year, but none now    Review of Systems   Constitutional: Negative. HENT:  Negative for congestion.     Respiratory:  Negative for shortness of breath. Cardiovascular:  Negative for chest pain. Gastrointestinal:  Negative for abdominal pain. Genitourinary:  Negative for dysuria. Musculoskeletal:  Negative for back pain. Skin:  Negative for rash. Neurological:  Negative for headaches. Psychiatric/Behavioral:  The patient is not nervous/anxious. Objective  Lymphatic:   no lymphadenopathy  Heent:   negative   Cor: regular rate and rhythm, no murmurs              Pul:clear to auscultation bilaterally- no wheezes, rales or rhonchi, normal air movement, no respiratory distress      GI: Abdomen soft, non-tender. BS normal. No masses,  No organomegaly           Extremities: normal strength, tone, and muscle mass   Breasts: Breast:normal appearance, no masses or tenderness   Pelvic Exam: GENITAL/URINARY:  External Genitalia:  General appearance; normal, Hair distribution; normal, Lesions absent  Urethral Meatus:  Size normal, Location normal, Lesions absent, Prolapse absent  Urethra: Fullness absent, Masses absent  Bladder:  Fullness absent, Masses absent, Tenderness absent, Cystocele absent  Vagina:  General appearance normal, Estrogen effect normal, Discharge absent, Lesions absent, Pelvic support normal  Cervix:  General appearance normal, Lesions absent, Discharge absent, Tenderness absent, Enlargement absent, Nodularity absent  Uterus:  Size normal, Tenderness absent  Adenexa: Masses absent, Tenderness absent  Anus/Perineum:  Lesions absent and Masses absent                                                         Assessment and Plan          Diagnosis Orders   1. Encounter for annual routine gynecological examination        2. Screening for malignant neoplasm of cervix  PAP SMEAR      3. Menopause  Follicle Stimulating Hormone    Luteinizing Hormone    Estradiol                I have discontinued Martine Marrero's lisinopril.  I am also having her maintain her therapeutic multivitamin-minerals, aspirin, naltrexone, Ozempic (0.25 or 0.5 MG/DOSE), and buPROPion. Return in about 1 year (around 11/2/2023) for yearly. She was also counseled on her preventative health maintenance recommendations and follow-up. There are no Patient Instructions on file for this visit.     Josue Garcia, [place] - [place],11/2/2022 12:54 PM

## 2022-11-03 LAB
HPV SAMPLE: NORMAL
HPV, GENOTYPE 16: NOT DETECTED
HPV, GENOTYPE 18: NOT DETECTED
HPV, HIGH RISK OTHER: NOT DETECTED
HPV, INTERPRETATION: NORMAL
SPECIMEN DESCRIPTION: NORMAL

## 2022-11-09 LAB — CYTOLOGY REPORT: NORMAL

## 2022-11-14 DIAGNOSIS — Z78.0 MENOPAUSE: ICD-10-CM

## 2023-02-02 ENCOUNTER — TELEPHONE (OUTPATIENT)
Dept: OBGYN | Age: 53
End: 2023-02-02

## 2023-02-02 ENCOUNTER — HOSPITAL ENCOUNTER (OUTPATIENT)
Dept: WOMENS IMAGING | Age: 53
Discharge: HOME | End: 2023-02-04
Payer: COMMERCIAL

## 2023-02-02 ENCOUNTER — HOSPITAL ENCOUNTER (OUTPATIENT)
Dept: ULTRASOUND IMAGING | Age: 53
Discharge: HOME | End: 2023-02-04
Payer: COMMERCIAL

## 2023-02-02 DIAGNOSIS — N61.0 INFLAMMATORY DISEASE OF LEFT BREAST: Primary | ICD-10-CM

## 2023-02-02 DIAGNOSIS — N63.0 BREAST SWELLING: Primary | ICD-10-CM

## 2023-02-02 DIAGNOSIS — N61.0 INFLAMMATORY DISEASE OF LEFT BREAST: ICD-10-CM

## 2023-02-02 DIAGNOSIS — N64.89 BREAST EDEMA: Primary | ICD-10-CM

## 2023-02-02 DIAGNOSIS — N61.0 INFLAMMATORY DISEASE OF BREAST: ICD-10-CM

## 2023-02-02 PROCEDURE — 76642 ULTRASOUND BREAST LIMITED: CPT

## 2023-02-02 PROCEDURE — 77065 DX MAMMO INCL CAD UNI: CPT

## 2023-02-02 NOTE — TELEPHONE ENCOUNTER
Patient informed of possible implant Rianna Ratliff advised breast MRI STAT and order placed. Patient requested to schedule test due to her work schedule and scheduling number provided.

## 2023-02-02 NOTE — TELEPHONE ENCOUNTER
Phone call from [facility] scheduling. States that order was placed for left breast MRI, scheduling is requiring order for bilateral breast MRI with and without and fax to order to [phone].

## 2023-02-03 ENCOUNTER — HOSPITAL ENCOUNTER (OUTPATIENT)
Dept: MRI IMAGING | Age: 53
Discharge: HOME | End: 2023-02-03
Payer: COMMERCIAL

## 2023-02-03 ENCOUNTER — ANCILLARY ORDERS (OUTPATIENT)
Dept: OBGYN | Age: 53
End: 2023-02-03

## 2023-02-03 DIAGNOSIS — N64.89 BREAST EDEMA: ICD-10-CM

## 2023-02-03 PROCEDURE — 77047 MRI BREAST C- BILATERAL: CPT

## 2023-02-03 PROCEDURE — A9579 GAD-BASE MR CONTRAST NOS,1ML: HCPCS | Performed by: ADVANCED PRACTICE MIDWIFE

## 2023-02-03 PROCEDURE — 6360000004 HC RX CONTRAST MEDICATION: Performed by: ADVANCED PRACTICE MIDWIFE

## 2023-02-05 ENCOUNTER — TELEPHONE (OUTPATIENT)
Dept: OBGYN | Age: 53
End: 2023-02-05

## 2023-02-05 DIAGNOSIS — T85.43XA RUPTURE OF IMPLANT OF LEFT BREAST, INITIAL ENCOUNTER: Primary | ICD-10-CM

## 2023-02-05 NOTE — TELEPHONE ENCOUNTER
Discussed with patient, she is having no pain, redness or fever, but significant edema on left breast.  Reviewed danger signs. Will place referral to Dr. Nedra Monroe, lets also phone their office and make sure they will see the patient for this, if they will not we need to find another group asap and I would go with [facility] as they seem to have several providers.

## 2023-02-06 ENCOUNTER — TELEPHONE (OUTPATIENT)
Dept: OBGYN | Age: 53
End: 2023-02-06

## 2023-02-06 ENCOUNTER — TELEPHONE (OUTPATIENT)
Dept: SURGERY | Age: 53
End: 2023-02-06

## 2023-02-06 NOTE — TELEPHONE ENCOUNTER
Spoke with patient. She will want to have implant replaced. She had placed due to cosmetic. She will call [facility] plastics to schedule.

## 2023-02-06 NOTE — TELEPHONE ENCOUNTER
Linda Henson made her own appointment with [facility] in [facility] for 1pm today. Can we try to get them the imaging before that time.

## 2023-02-06 NOTE — TELEPHONE ENCOUNTER
Imaging faxed to Vivi Rhodes in  [facility]. Appt confirmed for 1:00 pm today.

## 2023-09-19 ENCOUNTER — HOSPITAL ENCOUNTER
Age: 53
Discharge: HOME | End: 2023-09-19
Payer: COMMERCIAL

## 2023-09-19 DIAGNOSIS — I10: ICD-10-CM

## 2023-09-19 DIAGNOSIS — E78.2: ICD-10-CM

## 2023-09-19 DIAGNOSIS — R73.03: Primary | ICD-10-CM

## 2023-09-19 LAB
ALANINE AMINOTRANSFERASE: 19 U/L (ref 14–59)
ALBUMIN GLOBULIN RATIO: 1.2
ALBUMIN LEVEL: 3.5 G/DL (ref 3.4–5)
ALKALINE PHOSPHATASE: 69 U/L (ref 46–116)
ANION GAP: 10.2
ASPARTATE AMINO TRANSFERASE: 15 U/L (ref 15–37)
BLOOD UREA NITROGEN: 12 MG/DL (ref 7–18)
CALCIUM: 8.4 MG/DL (ref 8.5–10.1)
CARBON DIOXIDE: 29.9 MMOL/L (ref 21–32)
CHLORIDE: 107 MMOL/L (ref 98–107)
CHOLESTEROL: 210 MG/DL (ref ?–200)
CO2 BLD-SCNC: 29.9 MMOL/L (ref 21–32)
ESTIMATED GFR (AFRICAN AMERICA: >60 (ref 60–?)
ESTIMATED GFR (NON-AFRICAN AME: >60 (ref 60–?)
GLOBULIN: 2.9 G/DL
GLUCOSE BLD-MCNC: 85 MG/DL (ref 74–106)
HDL CHOLESTEROL: 72 MG/DL (ref 40–60)
POTASSIUM SERPLBLD-SCNC: 4.1 MMOL/L (ref 3.5–5.1)
POTASSIUM: 4.1 MMOL/L (ref 3.5–5.1)
SODIUM BLD-SCNC: 143 MMOL/L (ref 136–145)
SODIUM: 143 MMOL/L (ref 136–145)
TOTAL PROTEIN: 6.4 G/DL (ref 6.4–8.2)
TRIGLYCERIDES: 95 MG/DL (ref ?–150)
VLDL CHOLESTEROL: 19 MG/DL

## 2023-09-19 PROCEDURE — 80053 COMPREHEN METABOLIC PANEL: CPT

## 2023-09-19 PROCEDURE — 36415 COLL VENOUS BLD VENIPUNCTURE: CPT

## 2023-09-19 PROCEDURE — 80061 LIPID PANEL: CPT

## 2023-09-19 PROCEDURE — 83036 HEMOGLOBIN GLYCOSYLATED A1C: CPT

## 2023-11-08 ENCOUNTER — OFFICE VISIT (OUTPATIENT)
Dept: OBGYN | Age: 53
End: 2023-11-08

## 2023-11-08 ENCOUNTER — HOSPITAL ENCOUNTER (OUTPATIENT)
Age: 53
Setting detail: SPECIMEN
Discharge: HOME | End: 2023-11-08
Payer: COMMERCIAL

## 2023-11-08 VITALS
HEIGHT: 64 IN | BODY MASS INDEX: 25.88 KG/M2 | SYSTOLIC BLOOD PRESSURE: 118 MMHG | WEIGHT: 151.6 LBS | DIASTOLIC BLOOD PRESSURE: 74 MMHG

## 2023-11-08 DIAGNOSIS — Z12.31 SCREENING MAMMOGRAM, ENCOUNTER FOR: ICD-10-CM

## 2023-11-08 DIAGNOSIS — Z01.419 ENCOUNTER FOR ANNUAL ROUTINE GYNECOLOGICAL EXAMINATION: Primary | ICD-10-CM

## 2023-11-08 DIAGNOSIS — B37.0 THRUSH: ICD-10-CM

## 2023-11-08 DIAGNOSIS — Z12.4 SCREENING FOR MALIGNANT NEOPLASM OF CERVIX: ICD-10-CM

## 2023-11-08 PROCEDURE — G0145 SCR C/V CYTO,THINLAYER,RESCR: HCPCS

## 2023-11-08 RX ORDER — FLUCONAZOLE 100 MG/1
100 TABLET ORAL 2 TIMES DAILY
Qty: 20 TABLET | Refills: 0 | Status: SHIPPED | OUTPATIENT
Start: 2023-11-08 | End: 2023-11-18

## 2023-11-11 LAB — CYTOLOGY REPORT: NORMAL

## 2023-11-11 ASSESSMENT — ENCOUNTER SYMPTOMS
SHORTNESS OF BREATH: 0
ABDOMINAL PAIN: 0
BACK PAIN: 0

## 2023-11-22 ENCOUNTER — TELEPHONE (OUTPATIENT)
Dept: OBGYN | Age: 53
End: 2023-11-22

## 2023-11-22 NOTE — TELEPHONE ENCOUNTER
----- Message from Lorena Diaz, Cata Bryan sent at 11/8/2023 10:07 AM EST -----  Please check with patient if oral irritation not better or recurs please have her see oral surgeon for further evaluation

## 2023-12-16 ENCOUNTER — HOSPITAL ENCOUNTER
Dept: HOSPITAL 101 - LAB | Age: 53
Discharge: HOME | End: 2023-12-16
Payer: COMMERCIAL

## 2023-12-16 DIAGNOSIS — Z00.00: Primary | ICD-10-CM

## 2023-12-16 LAB
ADD MANUAL DIFF: NO
ALANINE AMINOTRANSFERASE: 29 U/L (ref 14–59)
ALBUMIN GLOBULIN RATIO: 1.1
ALBUMIN LEVEL: 3.5 G/DL (ref 3.4–5)
ALKALINE PHOSPHATASE: 75 U/L (ref 46–116)
ANION GAP: 11
ASPARTATE AMINO TRANSFERASE: 18 U/L (ref 15–37)
BLOOD UREA NITROGEN: 10 MG/DL (ref 7–18)
CALCIUM: 8.6 MG/DL (ref 8.5–10.1)
CARBON DIOXIDE: 29.2 MMOL/L (ref 21–32)
CHLORIDE: 102 MMOL/L (ref 98–107)
CHOLESTEROL: 222 MG/DL (ref ?–200)
CO2 BLD-SCNC: 29.2 MMOL/L (ref 21–32)
ESTIMATED GFR (AFRICAN AMERICA: >60 (ref 60–?)
ESTIMATED GFR (NON-AFRICAN AME: >60 (ref 60–?)
GLOBULIN: 3.3 G/DL
GLUCOSE BLD-MCNC: 85 MG/DL (ref 74–106)
HCT VFR BLD CALC: 42.2 % (ref 36–48)
HDL CHOLESTEROL: 93 MG/DL (ref 40–60)
HEMATOCRIT: 42.2 % (ref 36–48)
HEMOGLOBIN: 13.8 G/DL (ref 12–16)
IMMATURE GRANULOCYTES ABS AUTO: 0.01 10^3/UL (ref 0–0.03)
IMMATURE GRANULOCYTES PCT AUTO: 0.1 % (ref 0–0.5)
LYMPHOCYTES  ABSOLUTE AUTO: 2.1 10^3/UL (ref 1.2–3.8)
MCV RBC: 92.5 FL (ref 81–99)
MEAN CORPUSCULAR HEMOGLOBIN: 30.3 PG (ref 26.7–34)
MEAN CORPUSCULAR HGB CONC: 32.7 G/DL (ref 29.9–35.2)
MEAN CORPUSCULAR VOLUME: 92.5 FL (ref 81–99)
PLATELET # BLD: 268 10^3/UL (ref 150–450)
PLATELET COUNT: 268 10^3/UL (ref 150–450)
POTASSIUM SERPLBLD-SCNC: 4.2 MMOL/L (ref 3.5–5.1)
POTASSIUM: 4.2 MMOL/L (ref 3.5–5.1)
RED BLOOD COUNT: 4.56 10^6/UL (ref 4.2–5.4)
SODIUM BLD-SCNC: 138 MMOL/L (ref 136–145)
SODIUM: 138 MMOL/L (ref 136–145)
THYROID STIMULATING HORMONE: 2.9 UIU/ML (ref 0.36–3.74)
TOTAL PROTEIN: 6.8 G/DL (ref 6.4–8.2)
TRIGLYCERIDES: 55 MG/DL (ref ?–150)
URIC ACID: 5 MG/DL (ref 2.6–6)
VLDL CHOLESTEROL: 11 MG/DL
WBC # BLD: 8.8 10^3/UL (ref 4–11)
WHITE BLOOD COUNT: 8.8 10^3/UL (ref 4–11)

## 2023-12-16 PROCEDURE — 86060 ANTISTREPTOLYSIN O TITER: CPT

## 2023-12-16 PROCEDURE — 85025 COMPLETE CBC W/AUTO DIFF WBC: CPT

## 2023-12-16 PROCEDURE — 86235 NUCLEAR ANTIGEN ANTIBODY: CPT

## 2023-12-16 PROCEDURE — 36415 COLL VENOUS BLD VENIPUNCTURE: CPT

## 2023-12-16 PROCEDURE — 86140 C-REACTIVE PROTEIN: CPT

## 2023-12-16 PROCEDURE — 80053 COMPREHEN METABOLIC PANEL: CPT

## 2023-12-16 PROCEDURE — 83036 HEMOGLOBIN GLYCOSYLATED A1C: CPT

## 2023-12-16 PROCEDURE — 83525 ASSAY OF INSULIN: CPT

## 2023-12-16 PROCEDURE — 86431 RHEUMATOID FACTOR QUANT: CPT

## 2023-12-16 PROCEDURE — 84439 ASSAY OF FREE THYROXINE: CPT

## 2023-12-16 PROCEDURE — 86430 RHEUMATOID FACTOR TEST QUAL: CPT

## 2023-12-16 PROCEDURE — 80061 LIPID PANEL: CPT

## 2023-12-16 PROCEDURE — 86038 ANTINUCLEAR ANTIBODIES: CPT

## 2023-12-16 PROCEDURE — 85652 RBC SED RATE AUTOMATED: CPT

## 2023-12-16 PROCEDURE — 84443 ASSAY THYROID STIM HORMONE: CPT

## 2023-12-16 PROCEDURE — 84550 ASSAY OF BLOOD/URIC ACID: CPT

## 2024-05-28 ENCOUNTER — HOSPITAL ENCOUNTER
Dept: HOSPITAL 101 - RAD | Age: 54
End: 2024-05-28
Payer: COMMERCIAL

## 2024-05-28 DIAGNOSIS — M54.2: Primary | ICD-10-CM

## 2024-05-28 DIAGNOSIS — M54.50: ICD-10-CM

## 2024-05-28 PROCEDURE — 72100 X-RAY EXAM L-S SPINE 2/3 VWS: CPT

## 2024-05-28 PROCEDURE — 72040 X-RAY EXAM NECK SPINE 2-3 VW: CPT

## 2024-06-14 ENCOUNTER — HOSPITAL ENCOUNTER (OUTPATIENT)
Dept: HOSPITAL 101 - PT | Age: 54
LOS: 8 days | Discharge: HOME | End: 2024-06-22
Payer: COMMERCIAL

## 2024-06-14 DIAGNOSIS — M54.12: Primary | ICD-10-CM

## 2024-06-14 PROCEDURE — 97012 MECHANICAL TRACTION THERAPY: CPT

## 2024-06-14 PROCEDURE — 97161 PT EVAL LOW COMPLEX 20 MIN: CPT

## 2024-06-14 PROCEDURE — 97110 THERAPEUTIC EXERCISES: CPT

## 2024-06-14 PROCEDURE — 97140 MANUAL THERAPY 1/> REGIONS: CPT

## 2024-11-13 ENCOUNTER — OFFICE VISIT (OUTPATIENT)
Dept: OBGYN | Age: 54
End: 2024-11-13
Payer: COMMERCIAL

## 2024-11-13 VITALS
BODY MASS INDEX: 27.83 KG/M2 | WEIGHT: 163 LBS | DIASTOLIC BLOOD PRESSURE: 82 MMHG | SYSTOLIC BLOOD PRESSURE: 134 MMHG | HEIGHT: 64 IN

## 2024-11-13 DIAGNOSIS — Z12.39 ENCOUNTER FOR BREAST CANCER SCREENING USING NON-MAMMOGRAM MODALITY: ICD-10-CM

## 2024-11-13 DIAGNOSIS — Z01.419 ENCOUNTER FOR ANNUAL ROUTINE GYNECOLOGICAL EXAMINATION: Primary | ICD-10-CM

## 2024-11-13 PROCEDURE — G8484 FLU IMMUNIZE NO ADMIN: HCPCS | Performed by: ADVANCED PRACTICE MIDWIFE

## 2024-11-13 PROCEDURE — 99396 PREV VISIT EST AGE 40-64: CPT | Performed by: ADVANCED PRACTICE MIDWIFE

## 2024-11-13 SDOH — ECONOMIC STABILITY: FOOD INSECURITY: WITHIN THE PAST 12 MONTHS, YOU WORRIED THAT YOUR FOOD WOULD RUN OUT BEFORE YOU GOT MONEY TO BUY MORE.: NEVER TRUE

## 2024-11-13 SDOH — ECONOMIC STABILITY: INCOME INSECURITY: HOW HARD IS IT FOR YOU TO PAY FOR THE VERY BASICS LIKE FOOD, HOUSING, MEDICAL CARE, AND HEATING?: NOT HARD AT ALL

## 2024-11-13 SDOH — ECONOMIC STABILITY: FOOD INSECURITY: WITHIN THE PAST 12 MONTHS, THE FOOD YOU BOUGHT JUST DIDN'T LAST AND YOU DIDN'T HAVE MONEY TO GET MORE.: NEVER TRUE

## 2024-11-13 ASSESSMENT — PATIENT HEALTH QUESTIONNAIRE - PHQ9
SUM OF ALL RESPONSES TO PHQ QUESTIONS 1-9: 0
SUM OF ALL RESPONSES TO PHQ9 QUESTIONS 1 & 2: 0
SUM OF ALL RESPONSES TO PHQ QUESTIONS 1-9: 0
2. FEELING DOWN, DEPRESSED OR HOPELESS: NOT AT ALL
1. LITTLE INTEREST OR PLEASURE IN DOING THINGS: NOT AT ALL
SUM OF ALL RESPONSES TO PHQ QUESTIONS 1-9: 0
SUM OF ALL RESPONSES TO PHQ QUESTIONS 1-9: 0

## 2024-11-13 ASSESSMENT — ENCOUNTER SYMPTOMS
ABDOMINAL PAIN: 0
SHORTNESS OF BREATH: 0
BACK PAIN: 0

## 2024-11-13 NOTE — PROGRESS NOTES
YEARLY PHYSICAL    Date of service: 2024    Martine Marrero  Is a 54 y.o.  , female    PT's PCP is: Marcos Romero MD     : 1970                                             Subjective:       No LMP recorded. Patient is postmenopausal.     Are your menses regular: no    OB History    Para Term  AB Living   3 2 2   1 2   SAB IAB Ectopic Molar Multiple Live Births   1         2      # Outcome Date GA Lbr Ronny/2nd Weight Sex Type Anes PTL Lv   3 Term 01 40w0d  3.912 kg (8 lb 10 oz) M Vag-Spont   HANK   2 SAB            1 Term 97 39w0d  3.09 kg (6 lb 13 oz) F Vag-Spont   HANK        Social History     Tobacco Use   Smoking Status Never   Smokeless Tobacco Never        Social History     Substance and Sexual Activity   Alcohol Use Yes    Comment: social       Family History   Problem Relation Age of Onset    Rheum Arthritis Mother     Lung Cancer Father     Other Maternal Grandmother         COVID    Other Other         No family h/o ovarian or breast cancer, no family h/o DVT.        Any family history of breast or ovarian cancer: No    Any family history of blood clots: No    Allergies: Patient has no known allergies.      Current Outpatient Medications:     buPROPion (WELLBUTRIN XL) 300 MG extended release tablet, take 1 tablet by mouth every morning, Disp: , Rfl:     naltrexone (DEPADE) 50 MG tablet, take 0.5 tablet by mouth once daily, Disp: , Rfl:     OZEMPIC, 0.25 OR 0.5 MG/DOSE, 2 MG/1.5ML SOPN, inject 0.5 milligrams subcutaneously every week, Disp: , Rfl:     Multiple Vitamins-Minerals (THERAPEUTIC MULTIVITAMIN-MINERALS) tablet, Take 1 tablet by mouth daily, Disp: , Rfl:     aspirin 81 MG tablet, Take 1 tablet by mouth daily, Disp: , Rfl:     Social History     Substance and Sexual Activity   Sexual Activity Yes    Partners: Male    Birth control/protection: Surgical    Comment: spouse vasectomy

## 2024-12-02 ENCOUNTER — HOSPITAL ENCOUNTER (OUTPATIENT)
Dept: MRI IMAGING | Age: 54
Discharge: HOME | End: 2024-12-04
Attending: ADVANCED PRACTICE MIDWIFE
Payer: COMMERCIAL

## 2024-12-02 ENCOUNTER — HOSPITAL ENCOUNTER (OUTPATIENT)
Age: 54
Discharge: HOME | End: 2024-12-02
Attending: ADVANCED PRACTICE MIDWIFE
Payer: COMMERCIAL

## 2024-12-02 DIAGNOSIS — Z12.39 ENCOUNTER FOR BREAST CANCER SCREENING USING NON-MAMMOGRAM MODALITY: ICD-10-CM

## 2024-12-02 LAB
CREAT SERPL-MCNC: 0.8 MG/DL (ref 0.5–0.9)
GFR, ESTIMATED: >90 ML/MIN/1.73M2

## 2024-12-02 PROCEDURE — A9579 GAD-BASE MR CONTRAST NOS,1ML: HCPCS | Performed by: ADVANCED PRACTICE MIDWIFE

## 2024-12-02 PROCEDURE — 82565 ASSAY OF CREATININE: CPT

## 2024-12-02 PROCEDURE — 6360000004 HC RX CONTRAST MEDICATION: Performed by: ADVANCED PRACTICE MIDWIFE

## 2024-12-02 PROCEDURE — C8908 MRI W/O FOL W/CONT, BREAST,: HCPCS

## 2024-12-02 PROCEDURE — 36415 COLL VENOUS BLD VENIPUNCTURE: CPT

## 2024-12-02 RX ADMIN — GADOTERIDOL 14 ML: 279.3 INJECTION, SOLUTION INTRAVENOUS at 10:41

## 2024-12-05 NOTE — RESULT ENCOUNTER NOTE
Pt. notified of results and voices understanding. She will check with previous plastic surgeon and of they are no longer in business she will contact our office.

## 2024-12-11 ENCOUNTER — HOSPITAL ENCOUNTER
Dept: HOSPITAL 101 - LAB | Age: 54
Discharge: HOME | End: 2024-12-11
Payer: COMMERCIAL

## 2024-12-11 DIAGNOSIS — E78.2: Primary | ICD-10-CM

## 2024-12-11 DIAGNOSIS — I10: ICD-10-CM

## 2024-12-11 DIAGNOSIS — Z87.898: ICD-10-CM

## 2024-12-11 LAB
ALANINE AMINOTRANSFERASE: 24 U/L (ref 14–59)
ALBUMIN GLOBULIN RATIO: 1.1
ALBUMIN LEVEL: 3.3 G/DL (ref 3.4–5)
ALKALINE PHOSPHATASE: 97 U/L (ref 46–116)
ANION GAP: 10.3
ASPARTATE AMINO TRANSFERASE: 12 U/L (ref 15–37)
BLOOD UREA NITROGEN: 8 MG/DL (ref 7–18)
CALCIUM: 8.4 MG/DL (ref 8.5–10.1)
CARBON DIOXIDE: 27.7 MMOL/L (ref 21–32)
CHLORIDE: 106 MMOL/L (ref 98–107)
CHOLESTEROL: 218 MG/DL (ref ?–200)
CO2 BLD-SCNC: 27.7 MMOL/L (ref 21–32)
ESTIMATED GFR (AFRICAN AMERICA: >60
ESTIMATED GFR (NON-AFRICAN AME: >60
GLOBULIN: 3.1 G/DL
GLUCOSE BLD-MCNC: 83 MG/DL (ref 74–106)
HDL CHOLESTEROL: 85 MG/DL (ref 40–60)
POTASSIUM SERPLBLD-SCNC: 4 MMOL/L (ref 3.5–5.1)
POTASSIUM: 4 MMOL/L (ref 3.5–5.1)
SODIUM BLD-SCNC: 140 MMOL/L (ref 136–145)
SODIUM: 140 MMOL/L (ref 136–145)
TOTAL PROTEIN: 6.4 G/DL (ref 6.4–8.2)
TRIGLYCERIDES: 61 MG/DL (ref ?–150)
VLDL CHOLESTEROL: 12.2 MG/DL

## 2024-12-11 PROCEDURE — 80053 COMPREHEN METABOLIC PANEL: CPT

## 2024-12-11 PROCEDURE — 83036 HEMOGLOBIN GLYCOSYLATED A1C: CPT

## 2024-12-11 PROCEDURE — 36415 COLL VENOUS BLD VENIPUNCTURE: CPT

## 2024-12-11 PROCEDURE — 80061 LIPID PANEL: CPT

## 2025-02-03 ENCOUNTER — HOSPITAL ENCOUNTER
Dept: HOSPITAL 101 - LAB | Age: 55
Discharge: HOME | End: 2025-02-03
Payer: COMMERCIAL

## 2025-02-03 DIAGNOSIS — Z00.00: Primary | ICD-10-CM

## 2025-02-03 LAB
ADD MANUAL DIFF: NO
ALANINE AMINOTRANSFERASE: 24 U/L (ref 14–59)
ALBUMIN GLOBULIN RATIO: 1.2
ALBUMIN LEVEL: 3.4 G/DL (ref 3.4–5)
ALKALINE PHOSPHATASE: 93 U/L (ref 46–116)
ANION GAP: 12.8
ASPARTATE AMINO TRANSFERASE: 20 U/L (ref 15–37)
BLOOD UREA NITROGEN: 6 MG/DL (ref 7–18)
CALCIUM: 8.3 MG/DL (ref 8.5–10.1)
CARBON DIOXIDE: 28.4 MMOL/L (ref 21–32)
CHLORIDE: 107 MMOL/L (ref 98–107)
CO2 BLD-SCNC: 28.4 MMOL/L (ref 21–32)
ESTIMATED GFR (AFRICAN AMERICA: >60
ESTIMATED GFR (NON-AFRICAN AME: >60
GLOBULIN: 2.8 G/DL
GLUCOSE BLD-MCNC: 88 MG/DL (ref 74–106)
HCT VFR BLD CALC: 45.2 % (ref 36–48)
HEMATOCRIT: 45.2 % (ref 36–48)
HEMOGLOBIN: 14.8 G/DL (ref 12–16)
IMMATURE GRANULOCYTES ABS AUTO: 0.01 10^3/UL (ref 0–0.03)
IMMATURE GRANULOCYTES PCT AUTO: 0.1 % (ref 0–0.5)
INR: 1.3
LYMPHOCYTES  ABSOLUTE AUTO: 1.7 10^3/UL (ref 1.2–3.8)
MCV RBC: 90.9 FL (ref 81–99)
MEAN CORPUSCULAR HEMOGLOBIN: 29.8 PG (ref 26.7–34)
MEAN CORPUSCULAR HGB CONC: 32.7 G/DL (ref 29.9–35.2)
MEAN CORPUSCULAR VOLUME: 90.9 FL (ref 81–99)
PARTIAL THROMBOPLASTIN TIME: 27.8 SEC (ref 22.3–36.2)
PLATELET # BLD: 283 10^3/UL (ref 150–450)
PLATELET COUNT: 283 10^3/UL (ref 150–450)
POTASSIUM SERPLBLD-SCNC: 4.2 MMOL/L (ref 3.5–5.1)
POTASSIUM: 4.2 MMOL/L (ref 3.5–5.1)
PROTHROMBIN TIME: 13.4 SEC (ref 9–11.6)
RED BLOOD COUNT: 4.97 10^6/UL (ref 4.2–5.4)
SODIUM BLD-SCNC: 144 MMOL/L (ref 136–145)
SODIUM: 144 MMOL/L (ref 136–145)
TOTAL PROTEIN: 6.2 G/DL (ref 6.4–8.2)
WBC # BLD: 8.6 10^3/UL (ref 4–11)
WHITE BLOOD COUNT: 8.6 10^3/UL (ref 4–11)

## 2025-02-03 PROCEDURE — 80053 COMPREHEN METABOLIC PANEL: CPT

## 2025-02-03 PROCEDURE — 36415 COLL VENOUS BLD VENIPUNCTURE: CPT

## 2025-02-03 PROCEDURE — 85610 PROTHROMBIN TIME: CPT

## 2025-02-03 PROCEDURE — 85025 COMPLETE CBC W/AUTO DIFF WBC: CPT

## 2025-02-03 PROCEDURE — 85730 THROMBOPLASTIN TIME PARTIAL: CPT

## (undated) DEVICE — MEDI-VAC NON-CONDUCTIVE TUBING7MM X 30.5 (100FT): Brand: CARDINAL HEALTH

## (undated) DEVICE — SOLUTION IV IRRIG POUR BRL 0.9% SODIUM CHL 2F7124

## (undated) DEVICE — CANNULA ORAL NSL AD CO2 N INTUB O2 DEL DISP TRU LNK